# Patient Record
Sex: FEMALE | Race: WHITE | NOT HISPANIC OR LATINO | Employment: OTHER | URBAN - NONMETROPOLITAN AREA
[De-identification: names, ages, dates, MRNs, and addresses within clinical notes are randomized per-mention and may not be internally consistent; named-entity substitution may affect disease eponyms.]

---

## 2021-08-08 ENCOUNTER — HOSPITAL ENCOUNTER (EMERGENCY)
Facility: HOSPITAL | Age: 76
Discharge: HOME | End: 2021-08-08
Admitting: EMERGENCY MEDICINE

## 2021-08-08 ENCOUNTER — APPOINTMENT (OUTPATIENT)
Dept: GENERAL RADIOLOGY | Facility: HOSPITAL | Age: 76
End: 2021-08-08

## 2021-08-08 VITALS
HEART RATE: 73 BPM | TEMPERATURE: 98.5 F | WEIGHT: 130 LBS | DIASTOLIC BLOOD PRESSURE: 72 MMHG | HEIGHT: 63 IN | OXYGEN SATURATION: 97 % | SYSTOLIC BLOOD PRESSURE: 118 MMHG | RESPIRATION RATE: 19 BRPM | BODY MASS INDEX: 23.04 KG/M2

## 2021-08-08 DIAGNOSIS — R07.9 CHEST PAIN, UNSPECIFIED TYPE: Primary | ICD-10-CM

## 2021-08-08 LAB
ALBUMIN SERPL-MCNC: 5.2 G/DL (ref 3.5–5.2)
ALBUMIN/GLOB SERPL: 2.4 G/DL
ALP SERPL-CCNC: 87 U/L (ref 39–117)
ALT SERPL W P-5'-P-CCNC: 16 U/L (ref 1–33)
ANION GAP SERPL CALCULATED.3IONS-SCNC: 15 MMOL/L (ref 5–15)
AST SERPL-CCNC: 15 U/L (ref 1–32)
BASOPHILS # BLD AUTO: 0.03 10*3/MM3 (ref 0–0.2)
BASOPHILS NFR BLD AUTO: 0.6 % (ref 0–1.5)
BILIRUB SERPL-MCNC: 0.3 MG/DL (ref 0–1.2)
BUN SERPL-MCNC: 15 MG/DL (ref 8–23)
BUN/CREAT SERPL: 21.4 (ref 7–25)
CALCIUM SPEC-SCNC: 9.6 MG/DL (ref 8.6–10.5)
CHLORIDE SERPL-SCNC: 103 MMOL/L (ref 98–107)
CO2 SERPL-SCNC: 25 MMOL/L (ref 22–29)
CREAT SERPL-MCNC: 0.7 MG/DL (ref 0.57–1)
D DIMER PPP FEU-MCNC: 0.32 MG/L (FEU) (ref 0–0.5)
DEPRECATED RDW RBC AUTO: 40.2 FL (ref 37–54)
EOSINOPHIL # BLD AUTO: 0.11 10*3/MM3 (ref 0–0.4)
EOSINOPHIL NFR BLD AUTO: 2.1 % (ref 0.3–6.2)
ERYTHROCYTE [DISTWIDTH] IN BLOOD BY AUTOMATED COUNT: 12.8 % (ref 12.3–15.4)
GFR SERPL CREATININE-BSD FRML MDRD: 81 ML/MIN/1.73
GLOBULIN UR ELPH-MCNC: 2.2 GM/DL
GLUCOSE SERPL-MCNC: 92 MG/DL (ref 65–99)
HCT VFR BLD AUTO: 43.1 % (ref 34–46.6)
HGB BLD-MCNC: 15 G/DL (ref 12–15.9)
HOLD SPECIMEN: NORMAL
HOLD SPECIMEN: NORMAL
IMM GRANULOCYTES # BLD AUTO: 0 10*3/MM3 (ref 0–0.05)
IMM GRANULOCYTES NFR BLD AUTO: 0 % (ref 0–0.5)
INR PPP: 0.99 (ref 0.91–1.09)
LYMPHOCYTES # BLD AUTO: 2.07 10*3/MM3 (ref 0.7–3.1)
LYMPHOCYTES NFR BLD AUTO: 40.4 % (ref 19.6–45.3)
MCH RBC QN AUTO: 30.2 PG (ref 26.6–33)
MCHC RBC AUTO-ENTMCNC: 34.8 G/DL (ref 31.5–35.7)
MCV RBC AUTO: 86.9 FL (ref 79–97)
MONOCYTES # BLD AUTO: 0.36 10*3/MM3 (ref 0.1–0.9)
MONOCYTES NFR BLD AUTO: 7 % (ref 5–12)
NEUTROPHILS NFR BLD AUTO: 2.56 10*3/MM3 (ref 1.7–7)
NEUTROPHILS NFR BLD AUTO: 49.9 % (ref 42.7–76)
NRBC BLD AUTO-RTO: 0 /100 WBC (ref 0–0.2)
NT-PROBNP SERPL-MCNC: 37.9 PG/ML (ref 0–1800)
PLATELET # BLD AUTO: 322 10*3/MM3 (ref 140–450)
PMV BLD AUTO: 9.4 FL (ref 6–12)
POTASSIUM SERPL-SCNC: 3.8 MMOL/L (ref 3.5–5.2)
PROT SERPL-MCNC: 7.4 G/DL (ref 6–8.5)
PROTHROMBIN TIME: 12.3 SECONDS (ref 11.5–13.4)
RBC # BLD AUTO: 4.96 10*6/MM3 (ref 3.77–5.28)
SARS-COV-2 RNA PNL SPEC NAA+PROBE: NOT DETECTED
SODIUM SERPL-SCNC: 143 MMOL/L (ref 136–145)
TROPONIN T SERPL-MCNC: <0.01 NG/ML (ref 0–0.03)
TROPONIN T SERPL-MCNC: <0.01 NG/ML (ref 0–0.03)
WBC # BLD AUTO: 5.13 10*3/MM3 (ref 3.4–10.8)
WHOLE BLOOD HOLD SPECIMEN: NORMAL

## 2021-08-08 PROCEDURE — 93010 ELECTROCARDIOGRAM REPORT: CPT | Performed by: INTERNAL MEDICINE

## 2021-08-08 PROCEDURE — 93005 ELECTROCARDIOGRAM TRACING: CPT

## 2021-08-08 PROCEDURE — 87635 SARS-COV-2 COVID-19 AMP PRB: CPT | Performed by: NURSE PRACTITIONER

## 2021-08-08 PROCEDURE — 85610 PROTHROMBIN TIME: CPT | Performed by: NURSE PRACTITIONER

## 2021-08-08 PROCEDURE — 99284 EMERGENCY DEPT VISIT MOD MDM: CPT

## 2021-08-08 PROCEDURE — 83880 ASSAY OF NATRIURETIC PEPTIDE: CPT | Performed by: NURSE PRACTITIONER

## 2021-08-08 PROCEDURE — 84484 ASSAY OF TROPONIN QUANT: CPT

## 2021-08-08 PROCEDURE — 85379 FIBRIN DEGRADATION QUANT: CPT | Performed by: NURSE PRACTITIONER

## 2021-08-08 PROCEDURE — C9803 HOPD COVID-19 SPEC COLLECT: HCPCS | Performed by: NURSE PRACTITIONER

## 2021-08-08 PROCEDURE — 71045 X-RAY EXAM CHEST 1 VIEW: CPT

## 2021-08-08 PROCEDURE — 85025 COMPLETE CBC W/AUTO DIFF WBC: CPT

## 2021-08-08 PROCEDURE — 80053 COMPREHEN METABOLIC PANEL: CPT

## 2021-08-08 RX ORDER — LEVOTHYROXINE SODIUM 0.07 MG/1
75 TABLET ORAL DAILY
COMMUNITY

## 2021-08-08 RX ORDER — PHENOL 1.4 %
600 AEROSOL, SPRAY (ML) MUCOUS MEMBRANE DAILY
COMMUNITY

## 2021-08-08 RX ORDER — PANTOPRAZOLE SODIUM 40 MG/1
40 TABLET, DELAYED RELEASE ORAL DAILY
COMMUNITY

## 2021-08-08 RX ORDER — ASPIRIN 81 MG/1
81 TABLET ORAL NIGHTLY
COMMUNITY

## 2021-08-08 RX ORDER — VERAPAMIL HYDROCHLORIDE 120 MG/1
120 TABLET, FILM COATED ORAL NIGHTLY
COMMUNITY

## 2021-08-08 RX ORDER — PRAVASTATIN SODIUM 40 MG
40 TABLET ORAL NIGHTLY
COMMUNITY

## 2021-08-08 RX ORDER — METOPROLOL SUCCINATE 25 MG/1
12.5 TABLET, EXTENDED RELEASE ORAL DAILY
COMMUNITY

## 2021-08-08 RX ORDER — SODIUM CHLORIDE 0.9 % (FLUSH) 0.9 %
10 SYRINGE (ML) INJECTION AS NEEDED
Status: DISCONTINUED | OUTPATIENT
Start: 2021-08-08 | End: 2021-08-08 | Stop reason: HOSPADM

## 2021-08-08 RX ORDER — ASPIRIN 81 MG/1
324 TABLET, CHEWABLE ORAL ONCE
Status: COMPLETED | OUTPATIENT
Start: 2021-08-08 | End: 2021-08-08

## 2021-08-08 RX ADMIN — ASPIRIN 243 MG: 81 TABLET, CHEWABLE ORAL at 18:13

## 2021-08-08 NOTE — ED PROVIDER NOTES
Subjective   Patient is a 76-year-old white female presents the emergency department with intermittent left-sided chest pain and some shortness of breath for the past 3 days.  She states she was feeling very weak today.  She states she went for her walk yesterday and became a little more short of air.  She denies any cough or congestion.  No fever or chills.  She states she had a negative COVID-19 test last week.  She has had her Covid vaccine.  Patient does not have a history of CAD however she states both her mother and her father have had a history of heart disease.  She states she is not really having any pain at present just having some pressure in her chest.  She denies any radiation of the pain.  No nausea or vomiting.      History provided by:  Patient   used: No        Review of Systems   Constitutional: Negative.    HENT: Negative.    Eyes: Negative.    Respiratory: Negative.    Cardiovascular:        Patient is a 76-year-old white female presents the emergency department with intermittent left-sided chest pain and some shortness of breath for the past 3 days.  She states she was feeling very weak today.  She states she went for her walk yesterday and became a little more short of air.  She denies any cough or congestion.  No fever or chills.  She states she had a negative COVID-19 test last week.  She has had her Covid vaccine.  Patient does not have a history of CAD however she states both her mother and her father have had a history of heart disease.  She states she is not really having any pain at present just having some pressure in her chest.  She denies any radiation of the pain.  No nausea or vomiting.     Gastrointestinal: Negative.    Endocrine: Negative.    Genitourinary: Negative.    Musculoskeletal: Negative.    Skin: Negative.    Allergic/Immunologic: Negative.    Neurological: Negative.    Hematological: Negative.    Psychiatric/Behavioral: Negative.    All other systems  "reviewed and are negative.      Past Medical History:   Diagnosis Date   • Disease of thyroid gland    • GERD (gastroesophageal reflux disease)    • Hyperlipidemia    • Hypertension        No Known Allergies    Past Surgical History:   Procedure Laterality Date   • HYSTERECTOMY         History reviewed. No pertinent family history.    Social History     Socioeconomic History   • Marital status:      Spouse name: Not on file   • Number of children: Not on file   • Years of education: Not on file   • Highest education level: Not on file   Tobacco Use   • Smoking status: Never Smoker   Substance and Sexual Activity   • Alcohol use: Never   • Drug use: Never       Prior to Admission medications    Not on File       /72 (BP Location: Right arm, Patient Position: Sitting)   Pulse 73   Temp 98.5 °F (36.9 °C) (Oral)   Resp 19   Ht 160 cm (63\")   Wt 59 kg (130 lb)   SpO2 97%   BMI 23.03 kg/m²     Objective   Physical Exam  Vitals and nursing note reviewed.   Constitutional:       Appearance: She is well-developed.   HENT:      Head: Normocephalic and atraumatic.   Eyes:      Conjunctiva/sclera: Conjunctivae normal.      Pupils: Pupils are equal, round, and reactive to light.   Neck:      Thyroid: No thyromegaly.      Trachea: No tracheal deviation.   Cardiovascular:      Rate and Rhythm: Normal rate and regular rhythm.      Heart sounds: Normal heart sounds.   Pulmonary:      Effort: Pulmonary effort is normal. No respiratory distress.      Breath sounds: Normal breath sounds. No wheezing or rales.   Chest:      Chest wall: No tenderness.   Abdominal:      General: Bowel sounds are normal.      Palpations: Abdomen is soft.   Musculoskeletal:         General: Normal range of motion.      Cervical back: Normal range of motion and neck supple.   Skin:     General: Skin is warm and dry.   Neurological:      Mental Status: She is alert and oriented to person, place, and time.      Cranial Nerves: No cranial " nerve deficit.      Deep Tendon Reflexes: Reflexes are normal and symmetric.   Psychiatric:         Behavior: Behavior normal.         Thought Content: Thought content normal.         Judgment: Judgment normal.         Procedures         Lab Results (last 24 hours)     Procedure Component Value Units Date/Time    CBC & Differential [[id number]]  (Normal) Collected: 08/08/21 1759    Specimen: Blood Updated: 08/08/21 1830    Narrative:      The following orders were created for panel order CBC & Differential.  Procedure                               Abnormality         Status                     ---------                               -----------         ------                     CBC Auto Differential[[id number]]        Normal              Final result                 Please view results for these tests on the individual orders.    Comprehensive Metabolic Panel [[id number]] Collected: 08/08/21 1759    Specimen: Blood Updated: 08/08/21 1854     Glucose 92 mg/dL      BUN 15 mg/dL      Creatinine 0.70 mg/dL      Sodium 143 mmol/L      Potassium 3.8 mmol/L      Comment: Slight hemolysis detected by analyzer. Results may be affected.        Chloride 103 mmol/L      CO2 25.0 mmol/L      Calcium 9.6 mg/dL      Total Protein 7.4 g/dL      Albumin 5.20 g/dL      ALT (SGPT) 16 U/L      AST (SGOT) 15 U/L      Alkaline Phosphatase 87 U/L      Total Bilirubin 0.3 mg/dL      eGFR Non African Amer 81 mL/min/1.73      Globulin 2.2 gm/dL      A/G Ratio 2.4 g/dL      BUN/Creatinine Ratio 21.4     Anion Gap 15.0 mmol/L     Narrative:      GFR Normal >60  Chronic Kidney Disease <60  Kidney Failure <15      Troponin [[id number]]  (Normal) Collected: 08/08/21 1759    Specimen: Blood Updated: 08/08/21 1852     Troponin T <0.010 ng/mL     Narrative:      Troponin T Reference Range:  <= 0.03 ng/mL-   Negative for AMI  >0.03 ng/mL-     Abnormal for myocardial necrosis.  Clinicians would have to utilize clinical acumen, EKG, Troponin and serial  changes to determine if it is an Acute Myocardial Infarction or myocardial injury due to an underlying chronic condition.       Results may be falsely decreased if patient taking Biotin.      CBC Auto Differential [[id number]]  (Normal) Collected: 08/08/21 1759    Specimen: Blood Updated: 08/08/21 1830     WBC 5.13 10*3/mm3      RBC 4.96 10*6/mm3      Hemoglobin 15.0 g/dL      Hematocrit 43.1 %      MCV 86.9 fL      MCH 30.2 pg      MCHC 34.8 g/dL      RDW 12.8 %      RDW-SD 40.2 fl      MPV 9.4 fL      Platelets 322 10*3/mm3      Neutrophil % 49.9 %      Lymphocyte % 40.4 %      Monocyte % 7.0 %      Eosinophil % 2.1 %      Basophil % 0.6 %      Immature Grans % 0.0 %      Neutrophils, Absolute 2.56 10*3/mm3      Lymphocytes, Absolute 2.07 10*3/mm3      Monocytes, Absolute 0.36 10*3/mm3      Eosinophils, Absolute 0.11 10*3/mm3      Basophils, Absolute 0.03 10*3/mm3      Immature Grans, Absolute 0.00 10*3/mm3      nRBC 0.0 /100 WBC     BNP [[id number]]  (Normal) Collected: 08/08/21 1759    Specimen: Blood Updated: 08/08/21 1851     proBNP 37.9 pg/mL     Narrative:      Among patients with dyspnea, NT-proBNP is highly sensitive for the detection of acute congestive heart failure. In addition NT-proBNP of <300 pg/ml effectively rules out acute congestive heart failure with 99% negative predictive value.    Results may be falsely decreased if patient taking Biotin.      Protime-INR [[id number]]  (Normal) Collected: 08/08/21 1817    Specimen: Blood Updated: 08/08/21 1840     Protime 12.3 Seconds      INR 0.99    D-dimer, Quantitative [[id number]]  (Normal) Collected: 08/08/21 1817    Specimen: Blood Updated: 08/08/21 1840     D-Dimer, Quantitative 0.32 mg/L (FEU)     Narrative:      Reference Range is 0-0.50 mg/L FEU. However, results <0.50 mg/L FEU tends to rule out DVT or PE. Results >0.50 mg/L FEU are not useful in predicting absence or presence of DVT or PE.      COVID PRE-OP / PRE-PROCEDURE SCREENING ORDER (NO  ISOLATION) - Swab, Nasal Cavity [[id number]]  (Normal) Collected: 08/08/21 1818    Specimen: Swab from Nasal Cavity Updated: 08/08/21 1902    Narrative:      The following orders were created for panel order COVID PRE-OP / PRE-PROCEDURE SCREENING ORDER (NO ISOLATION) - Swab, Nasal Cavity.  Procedure                               Abnormality         Status                     ---------                               -----------         ------                     COVID-19,[facility]...[[id number]]  Normal              Final result                 Please view results for these tests on the individual orders.    COVID-19,[facility]-HOU[facility],Nasal Swab No Transport Media 3-4 HR TAT - Swab, Nasal Cavity [[id number]]  (Normal) Collected: 08/08/21 1818    Specimen: Swab from Nasal Cavity Updated: 08/08/21 1902     COVID19 Not Detected    Narrative:      Fact sheet for providers: https://www.fda.gov/media/323590/download     Fact sheet for patients: https://www.fda.gov/media/164990/download    Test performed by PCR.    Consider negative results in combination with clinical observations, patient history, and epidemiological information.    Troponin [[id number]]  (Normal) Collected: 08/08/21 2016    Specimen: Blood Updated: 08/08/21 2046     Troponin T <0.010 ng/mL     Narrative:      Troponin T Reference Range:  <= 0.03 ng/mL-   Negative for AMI  >0.03 ng/mL-     Abnormal for myocardial necrosis.  Clinicians would have to utilize clinical acumen, EKG, Troponin and serial changes to determine if it is an Acute Myocardial Infarction or myocardial injury due to an underlying chronic condition.       Results may be falsely decreased if patient taking Biotin.            XR Chest 1 View   Final Result   1.. Bibasilar atelectasis. Lungs are otherwise clear. No evidence of   lobar pneumonia.   This report was finalized on 08/08/2021 19:45 by Dr. Miguel Hilario MD.          ED Course  ED Course as of Aug 09 1353   Sun Aug 08, 2021    1900 Reviewed results with pt thus far. Her ekg and troponin are negative and pending the 2nd troponin and ekg at this time. Pt denies chest pain at present. Due to pt's hx of htn and hyperlipidemia, offered admission for stress test tomorrow. Pt states that she wants to do 2nd set of cardiac enzymes and ekg and go home and have outpt stress test. Pt states that she will need to do dobutamine test. She denies chest pain at present. Reviewed pt and pt care plan with ABDIAZIZ MUNROE. Care of pt transferred at this time     [CW]   2109 Second troponin is negative.  His repeat EKG is abnormal per Dr Kramer.  I discussed admission with her.  She continues to decline admission.  I then discussed the cardiac stress test with her.  She states she has decided she does not want dobutamine.  She would like a treadmill stress test which I will order.  She voiced understanding of all results and instructions.     [KS]      ED Course User Index  [CW] Francesca Ronquillo APRN  [KS] Ed Connelly APRN         HEART Score (for prediction of 6-week risk of major adverse cardiac event) reviewed and/or performed as part of the patient evaluation and treatment planning process.  The result associated with this review/performance is: 3      MDM  Number of Diagnoses or Management Options  Chest pain, unspecified type: new and requires workup     Amount and/or Complexity of Data Reviewed  Clinical lab tests: ordered and reviewed  Tests in the radiology section of CPT®: ordered and reviewed    Patient Progress  Patient progress: stable      Final diagnoses:   Chest pain, unspecified type          Francesca Ronquillo APRN  08/09/21 8024

## 2021-08-08 NOTE — ED TRIAGE NOTES
Pt reports chest heaviness for the last 3 days. Patient denies heart hx other than htn.Patient took an 81mg asa, denies nitro.

## 2021-08-09 LAB
QT INTERVAL: 414 MS
QT INTERVAL: 440 MS
QTC INTERVAL: 430 MS
QTC INTERVAL: 450 MS

## 2021-08-09 NOTE — DISCHARGE INSTRUCTIONS
Drink plenty of fluid. Continue home medication.  Cardiac stress test to be completed as an outpatient.  Scheduling should call you.  Their number is [phone].  Follow up with PCP - call tomorrow for appointment. Return to ED if condition does not improve or worsens

## 2021-08-19 ENCOUNTER — HOSPITAL ENCOUNTER (OUTPATIENT)
Dept: CARDIOLOGY | Facility: HOSPITAL | Age: 76
Discharge: HOME | End: 2021-08-19
Admitting: NURSE PRACTITIONER

## 2021-08-19 VITALS — BODY MASS INDEX: 22.5 KG/M2 | WEIGHT: 127 LBS | HEIGHT: 63 IN

## 2021-08-19 DIAGNOSIS — R07.9 CHEST PAIN, UNSPECIFIED TYPE: ICD-10-CM

## 2021-08-19 LAB
BH CV STRESS BP STAGE 1: NORMAL
BH CV STRESS BP STAGE 2: NORMAL
BH CV STRESS DURATION MIN STAGE 1: 3
BH CV STRESS DURATION MIN STAGE 2: 2
BH CV STRESS DURATION SEC STAGE 1: 0
BH CV STRESS DURATION SEC STAGE 2: 19
BH CV STRESS GRADE STAGE 1: 10
BH CV STRESS GRADE STAGE 2: 12
BH CV STRESS HR STAGE 1: 117
BH CV STRESS HR STAGE 2: 129
BH CV STRESS METS STAGE 1: 5
BH CV STRESS METS STAGE 2: 7.5
BH CV STRESS PROTOCOL 1: NORMAL
BH CV STRESS RECOVERY BP: NORMAL MMHG
BH CV STRESS RECOVERY HR: 80 BPM
BH CV STRESS SPEED STAGE 1: 1.7
BH CV STRESS SPEED STAGE 2: 2.5
BH CV STRESS STAGE 1: 1
BH CV STRESS STAGE 2: 2
MAXIMAL PREDICTED HEART RATE: 144 BPM
PERCENT MAX PREDICTED HR: 89.58 %
STRESS BASELINE BP: NORMAL MMHG
STRESS BASELINE HR: 67 BPM
STRESS PERCENT HR: 105 %
STRESS POST ESTIMATED WORKLOAD: 7.5 METS
STRESS POST EXERCISE DUR MIN: 5 MIN
STRESS POST EXERCISE DUR SEC: 19 SEC
STRESS POST PEAK BP: NORMAL MMHG
STRESS POST PEAK HR: 129 BPM
STRESS TARGET HR: 122 BPM

## 2021-08-19 PROCEDURE — 93018 CV STRESS TEST I&R ONLY: CPT | Performed by: INTERNAL MEDICINE

## 2021-08-19 PROCEDURE — 93017 CV STRESS TEST TRACING ONLY: CPT

## 2021-08-19 PROCEDURE — 93350 STRESS TTE ONLY: CPT | Performed by: INTERNAL MEDICINE

## 2021-08-19 PROCEDURE — 93352 ADMIN ECG CONTRAST AGENT: CPT | Performed by: INTERNAL MEDICINE

## 2021-08-19 PROCEDURE — 93350 STRESS TTE ONLY: CPT

## 2021-08-19 PROCEDURE — 25010000002 PERFLUTREN 6.52 MG/ML SUSPENSION: Performed by: INTERNAL MEDICINE

## 2021-08-19 RX ADMIN — PERFLUTREN 8.48 MG: 6.52 INJECTION, SUSPENSION INTRAVENOUS at 13:42

## 2021-08-23 ENCOUNTER — TELEPHONE (OUTPATIENT)
Dept: EMERGENCY DEPT | Facility: HOSPITAL | Age: 76
End: 2021-08-23

## 2021-08-23 NOTE — TELEPHONE ENCOUNTER
----- Message from Eliu Souza PA-C sent at 8/19/2021  7:19 PM CDT -----  Please call patient to advise of low risk stress test and need for continued PCP follow-up.

## 2021-08-24 ENCOUNTER — TELEPHONE (OUTPATIENT)
Dept: EMERGENCY DEPT | Facility: HOSPITAL | Age: 76
End: 2021-08-24

## 2022-07-12 RX ORDER — FAMOTIDINE 40 MG/1
40 TABLET, FILM COATED ORAL DAILY
COMMUNITY

## 2022-07-18 ENCOUNTER — OFFICE VISIT (OUTPATIENT)
Dept: CARDIOLOGY | Facility: CLINIC | Age: 77
End: 2022-07-18

## 2022-07-18 VITALS
HEART RATE: 63 BPM | DIASTOLIC BLOOD PRESSURE: 80 MMHG | OXYGEN SATURATION: 98 % | BODY MASS INDEX: 23.21 KG/M2 | WEIGHT: 131 LBS | SYSTOLIC BLOOD PRESSURE: 124 MMHG | HEIGHT: 63 IN

## 2022-07-18 DIAGNOSIS — E78.2 MIXED HYPERLIPIDEMIA: ICD-10-CM

## 2022-07-18 DIAGNOSIS — R93.1 ABNORMAL ECHOCARDIOGRAM: Primary | ICD-10-CM

## 2022-07-18 DIAGNOSIS — I10 PRIMARY HYPERTENSION: ICD-10-CM

## 2022-07-18 PROCEDURE — 93000 ELECTROCARDIOGRAM COMPLETE: CPT | Performed by: INTERNAL MEDICINE

## 2022-07-18 PROCEDURE — 99204 OFFICE O/P NEW MOD 45 MIN: CPT | Performed by: INTERNAL MEDICINE

## 2022-07-18 NOTE — PROGRESS NOTES
Subjective:     Encounter Date:07/18/2022      Patient ID: Twila Tanner is a 77 y.o. female with hypertension, hyperlipidemia, hypothyroidism, referred here for further evaluation of an abnormal echocardiogram.    Referring Provider: LUDWIG Norman  Reason for referral: Abnormal echocardiogram    Chief Complaint: Here today for further evaluation after an abnormal echocardiogram    HPI: This is a 77-year-old female with no prior cardiac history but with a history of hypertension, hyperlipidemia who presents today for further evaluation of an abnormal echocardiogram.  The patient had a col or upper respiratory type illness earlier this year.  She developed prolonged coughing and some shortness of breath related to this.  It was during this time that the patient had a chest x-ray which was interpreted as having cardiomegaly.  This was then followed with an echocardiogram which is referenced below.  The patient now presents today to discuss the results of this study.  The patient notes that her breathing has since stabilized.  She is no longer coughing.  No significant shortness of breath, dyspnea on exertion.  The patient denies ever having any chest discomfort of any significance.  She denies having palpitations, lightheadedness, dizziness, syncope.  No orthopnea, PND, edema.  The patient does have hypertension and notes that her blood pressure is generally well controlled.  She also is on statin therapy due to history of hyperlipidemia.  Her lipids have been reportedly well controlled.  The patient denies having any other cardiac issues including coronary artery disease, heart failure, arrhythmias.  She says that she seems to be doing well and feeling well at this time.    The following portions of the patient's history were reviewed and updated as appropriate: allergies, current medications, past family history, past medical history, past social history, past surgical history and problem list.      Past Medical History:   Diagnosis Date   • Disease of thyroid gland    • GERD (gastroesophageal reflux disease)    • Hyperlipidemia    • Hypertension      Past Surgical History:   Procedure Laterality Date   • HYSTERECTOMY         Current Outpatient Medications:   •  aspirin 81 MG EC tablet, Take 81 mg by mouth Every Night., Disp: , Rfl:   •  Cyanocobalamin (VITAMIN B-12 PO), Take 2,500 mcg by mouth Daily., Disp: , Rfl:   •  famotidine (PEPCID) 40 MG tablet, Take 40 mg by mouth Daily., Disp: , Rfl:   •  levothyroxine (SYNTHROID, LEVOTHROID) 75 MCG tablet, Take 75 mcg by mouth Daily., Disp: , Rfl:   •  metoprolol succinate XL (TOPROL-XL) 25 MG 24 hr tablet, Take 12.5 mg by mouth Daily., Disp: , Rfl:   •  Multiple Vitamins-Minerals (OCUVITE ADULT 50+ PO), Take 1 tablet by mouth Daily., Disp: , Rfl:   •  pantoprazole (PROTONIX) 40 MG EC tablet, Take 40 mg by mouth Daily., Disp: , Rfl:   •  pravastatin (PRAVACHOL) 40 MG tablet, Take 40 mg by mouth Every Night., Disp: , Rfl:   •  verapamil (CALAN) 120 MG tablet, Take 120 mg by mouth Every Night., Disp: , Rfl:   •  calcium carbonate (OS-VARGAS) 600 MG tablet, Take 600 mg by mouth Daily., Disp: , Rfl:     No Known Allergies    Social History     Tobacco Use   • Smoking status: Former Smoker     Years: 50.00     Types: Cigarettes     Quit date:      Years since quittin.5   • Smokeless tobacco: Never Used   Substance Use Topics   • Alcohol use: Never     Family History   Problem Relation Age of Onset   • Heart disease Mother    • Cancer Father      Review of Systems   Constitutional: Negative for chills, fever and weight loss.   HENT: Negative for congestion and hearing loss.    Eyes: Negative for blurred vision and pain.   Cardiovascular: Negative for chest pain, dyspnea on exertion, leg swelling, orthopnea, palpitations, paroxysmal nocturnal dyspnea and syncope.   Respiratory: Negative for cough, shortness of breath and wheezing.    Endocrine: Negative for cold  intolerance and heat intolerance.   Hematologic/Lymphatic: Negative for adenopathy and bleeding problem.   Skin: Negative for color change, poor wound healing and rash.   Musculoskeletal: Negative for myalgias and neck pain.   Gastrointestinal: Negative for abdominal pain, nausea and vomiting.   Genitourinary: Negative for dysuria and frequency.   Neurological: Negative for dizziness, focal weakness, headaches, light-headedness, loss of balance and numbness.   Psychiatric/Behavioral: Negative for altered mental status and memory loss.   Allergic/Immunologic: Negative for hives and persistent infections.       ECG 12 Lead    Date/Time: 7/18/2022 12:42 PM  Performed by: Saul Meier MD  Authorized by: aSul Meier MD   Comparison: compared with previous ECG from 8/8/2021  Similar to previous ECG  Rhythm: sinus rhythm  Rate: normal  BPM: 67  Conduction: conduction normal  QRS axis: normal  Other findings: non-specific ST-T wave changes    Clinical impression: non-specific ECG             Objective:     Vitals reviewed.   Constitutional:       General: Not in acute distress.     Appearance: Normal and healthy appearance. Well-developed. Not toxic-appearing or diaphoretic.   Eyes:      General: Lids are normal.      Extraocular Movements: Extraocular movements intact.      Pupils: Pupils are equal, round, and reactive to light.   HENT:      Head: Normocephalic and atraumatic.      Right Ear: External ear normal.      Left Ear: External ear normal.      Nose: Nose normal.    Mouth/Throat:      Mouth: Mucous membranes are not pale, not dry and not cyanotic.   Neck:      Thyroid: No thyroid mass or thyromegaly.      Vascular: No carotid bruit, hepatojugular reflux or JVD.      Trachea: No tracheal deviation.      Lymphadenopathy: No cervical adenopathy.   Pulmonary:      Effort: Pulmonary effort is normal. No accessory muscle usage or respiratory distress.      Breath sounds: Normal breath sounds. No  "wheezing. No rhonchi. No rales.   Chest:      Chest wall: Not tender to palpatation.   Cardiovascular:      Normal rate. Regular rhythm.      Murmurs: There is no murmur.      No gallop.   Pulses:     Intact distal pulses.   Edema:     Peripheral edema absent.   Abdominal:      General: Bowel sounds are normal. There is no distension or abdominal bruit.      Palpations: Abdomen is soft.      Tenderness: There is no abdominal tenderness.   Musculoskeletal: Normal range of motion.         General: No tenderness or deformity.      Extremities: No clubbing present.     Cervical back: Normal range of motion and neck supple. No edema. Skin:     General: Skin is warm and dry. There is no cyanosis.      Findings: No erythema or rash.   Neurological:      General: No focal deficit present.      Mental Status: Oriented to person, place, and time and oriented to person, place and time.      Cranial Nerves: No cranial nerve deficit.   Psychiatric:         Attention and Perception: Attention normal.         Mood and Affect: Mood normal.         Speech: Speech normal.         Behavior: Behavior normal. Behavior is cooperative.         Thought Content: Thought content normal.       /80   Pulse 63   Ht 160 cm (63\")   Wt 59.4 kg (131 lb)   SpO2 98%   BMI 23.21 kg/m²     Data/Lab Review:     Labs from 6/10/2022: Hemoglobin 14, hematocrit 43, platelets 363,000, potassium 4.1, creatinine 0.7, TSH of 0.368 with free T4 1.78.  Also lipid panel shows LDL 75, HDL 52, triglycerides 443    Chest x-ray from 6/13/2022 shows no previous chest x-ray for comparison.  The report indicates borderline enlarged heart.  There is conflicting information as the cardiothoracic ratio was reported as being enlarged however the following statement says the heart appears to be within normal size limits on the lateral view.    Echocardiogram performed on 6/23/2022: This study was ordered with an indication of \"cardiomegaly\".  I was able to review " the images from the echocardiogram performed on 6/23/2022.  Left ventricular ejection fraction appears to be normal or low normal.  Diastolic dysfunction is noted.  Normal right ventricular size and systolic function.  No hemodynamically significant valvular abnormalities.    Results for orders placed during the hospital encounter of 08/19/21    Adult Stress Echo W/ Cont or Stress Agent if Necessary Per Protocol    Interpretation Summary  · Low risk stress test.  · No clinical, ECG, or echocardiographic evidence of ischemia.  · Florentino treadmill score +5.3 (low risk).        Assessment:          Diagnosis Plan   1. Abnormal echocardiogram  ECG 12 Lead   2. Primary hypertension     3. Mixed hyperlipidemia            Plan:       1.  Abnormal echocardiogram: We did review the patient's echocardiogram today.  I viewed all of the images.  The patient has low normal systolic function.  As far as the indication for the study being cardiomegaly, the patient does have normal cardiac size.  Normal wall thickness and normal ventricular sizes noted.  Therefore, the patient does not have an enlarged heart.  She does have borderline left atrial enlargement, however this is not clinically significant, especially given her age and history of hypertension, diastolic dysfunction.  No further cardiac work-up would be indicated at this time given the findings of the echocardiogram.  While the echocardiogram is not completely normal due to the presence of diastolic dysfunction and slight left atrial enlargement, there are no pathologic findings that would require any further evaluation at this time.  I did discuss the findings of the echocardiogram in detail with the patient today at length.  All questions were answered and she seemed relieved with the recommendation that no further work-up would be indicated.    2.  Essential hypertension: Blood pressure is currently well controlled.  Continue to monitor.  Continue current  medications.    3.  Mixed hyperlipidemia: The patient is on statin therapy and tolerating this well.  Her most recent lipid panel is referenced above, showing LDL cholesterol to be reasonable given the patient's current risk profile.    We will plan to see the patient back as needed at this time.

## 2022-07-21 ENCOUNTER — PATIENT ROUNDING (BHMG ONLY) (OUTPATIENT)
Dept: CARDIOLOGY | Facility: CLINIC | Age: 77
End: 2022-07-21

## 2022-07-21 NOTE — PROGRESS NOTES
A [employer] message has been sent to the patient for PATIENT ROUNDING with [facility].

## 2023-08-04 ENCOUNTER — TELEPHONE (OUTPATIENT)
Dept: CARDIOLOGY | Facility: CLINIC | Age: 78
End: 2023-08-04

## 2023-08-04 NOTE — TELEPHONE ENCOUNTER
[facility] received a fax that requires your attention. The document has been indexed to the patient’s chart for your review.      Reason for sending: EXTERNAL MEDICAL RECORD NOTIFICATION     Documents Description: PROGRESS NOTE & EKG    Name of Sender: [facility]     Date Indexed: 8.4.23

## 2023-08-07 ENCOUNTER — TELEPHONE (OUTPATIENT)
Dept: CARDIOLOGY | Facility: CLINIC | Age: 78
End: 2023-08-07

## 2023-08-07 NOTE — TELEPHONE ENCOUNTER
[facility] received a fax that requires your attention. The document has been indexed to the patient’s chart for your review.      Reason for sending: EXTERNAL MEDICAL RECORD NOTIFICATION     Documents Description: PROGRESS NOTES     Name of Sender: [facility]     Date Indexed: 8.4.23

## 2023-08-08 ENCOUNTER — OFFICE VISIT (OUTPATIENT)
Dept: CARDIOLOGY | Facility: CLINIC | Age: 78
End: 2023-08-08
Payer: MEDICARE

## 2023-08-08 VITALS
BODY MASS INDEX: 23.39 KG/M2 | HEIGHT: 63 IN | WEIGHT: 132 LBS | DIASTOLIC BLOOD PRESSURE: 70 MMHG | SYSTOLIC BLOOD PRESSURE: 120 MMHG | HEART RATE: 86 BPM | OXYGEN SATURATION: 97 %

## 2023-08-08 DIAGNOSIS — E78.5 HYPERLIPIDEMIA LDL GOAL <100: ICD-10-CM

## 2023-08-08 DIAGNOSIS — E03.9 HYPOTHYROIDISM (ACQUIRED): ICD-10-CM

## 2023-08-08 DIAGNOSIS — I10 PRIMARY HYPERTENSION: Primary | ICD-10-CM

## 2023-08-08 PROCEDURE — 1160F RVW MEDS BY RX/DR IN RCRD: CPT | Performed by: NURSE PRACTITIONER

## 2023-08-08 PROCEDURE — 93000 ELECTROCARDIOGRAM COMPLETE: CPT | Performed by: NURSE PRACTITIONER

## 2023-08-08 PROCEDURE — 1159F MED LIST DOCD IN RCRD: CPT | Performed by: NURSE PRACTITIONER

## 2023-08-08 PROCEDURE — 99214 OFFICE O/P EST MOD 30 MIN: CPT | Performed by: NURSE PRACTITIONER

## 2023-08-08 RX ORDER — EZETIMIBE 10 MG/1
10 TABLET ORAL DAILY
Qty: 30 TABLET | Refills: 1 | Status: SHIPPED | OUTPATIENT
Start: 2023-08-08

## 2023-08-08 NOTE — PROGRESS NOTES
Subjective:     Encounter Date:08/08/2023      Patient ID: Twila Tanner is a 78 y.o. female with hypertension, hypothyroidism, hyperlipidemia, and family history of CAD, and recent diagnosis of arthritis.  She presents today at that recommendation of her primary provider due to recent elevated BP.      Chief Complaint: Hypertension  History of Present Illness    Ms. Tanner presents to the office today for follow-up due to recent elevations in her blood pressure at the recommendations of her primary care provider.  She actually been evaluated by Dr. Meier in July 2022 when she was referred to our office due to reported abnormal echocardiogram.  At that time, imaging was reviewed and discussed with the patient at her office visit.  And ultimately no further workup was indicated at that time and the patient was recommended to follow-up in our office as needed.    She has known hypertension and hyperlipidemia which is managed through her primary care office.    She tells me that she was diagnosed with arthritis in February of this year.  Since February she has had difficulty with tolerating certain activities and become less active given arthritis.  She tells me she has had increased fatigue since February but blames this on a decrease in activity levels including routine exercise.  In addition to that she has also been started on replacement therapy for hypothyroidism.  She tells me her blood work was recently abnormal once again.  She has required multiple doses and medications.     Last Thursday she had an elevated blood pressure reading at home, 166/89.  She went to her primary care office or blood pressure was rechecked noted to be 172/100.  She tells me that they performed blood work and an ECG.  She was told that her ECG was okay but that her labs indicated abnormalities with her thyroid once again.  It was at that time she was recommended to be followed up with cardiology given her blood  pressure.    She reports generally that her blood pressures are well-controlled.  She checked her blood pressure this morning and it was 144/77.  This was just after taking her morning medications.  She tells me that she has been managed on verapamil and metoprolol for some time.    She denies any chest pain, chest tightness.  She reports some mild chest pressure from time to time lasting only a few seconds not felt to be worsening occurring at random.  Denies any profound episodes of chest discomfort or shortness of breath.  She denies any significant dyspnea on exertion.  She denies orthopnea or PND.  Her main complaint regarding shortness of breath is when she bends over.  She denies any lightheadedness, dizziness, near-syncope, syncope.  She has had no palpitations.  She denies any significant leg swelling, abdominal bloating.      The following portions of the patient's history were reviewed and updated as appropriate: allergies, current medications, past family history, past medical history, past social history, and past surgical history.    No Known Allergies      Current Outpatient Medications:     aspirin 81 MG EC tablet, Take 1 tablet by mouth Every Night., Disp: , Rfl:     Cyanocobalamin (VITAMIN B-12 PO), Take 2,500 mcg by mouth Daily., Disp: , Rfl:     levothyroxine (SYNTHROID, LEVOTHROID) 50 MCG tablet, Take 1 tablet by mouth Daily., Disp: , Rfl:     metoprolol succinate XL (TOPROL-XL) 25 MG 24 hr tablet, Take 0.5 tablets by mouth Daily., Disp: , Rfl:     pantoprazole (PROTONIX) 40 MG EC tablet, Take 1 tablet by mouth Daily., Disp: , Rfl:     verapamil (CALAN) 120 MG tablet, Take 1 tablet by mouth Every Night., Disp: , Rfl:     ezetimibe (ZETIA) 10 MG tablet, Take 1 tablet by mouth Daily., Disp: 30 tablet, Rfl: 1      Review of Systems   Constitutional: Positive for malaise/fatigue.   Cardiovascular:  Positive for chest pain (chest presure on rare occassion - mild). Negative for dyspnea on exertion,  "irregular heartbeat, leg swelling, near-syncope, orthopnea, palpitations, paroxysmal nocturnal dyspnea and syncope.   Respiratory:  Negative for cough, shortness of breath and wheezing.    Hematologic/Lymphatic: Negative for bleeding problem. Bruises/bleeds easily.   Musculoskeletal:  Positive for arthritis.   Gastrointestinal:  Negative for nausea and vomiting.   Neurological:  Negative for dizziness, focal weakness, headaches, light-headedness and weakness.   Psychiatric/Behavioral:  Negative for altered mental status.        ECG 12 Lead    Date/Time: 8/8/2023 12:46 PM  Performed by: Evangelina Goldman APRN  Authorized by: Evangelina Goldman APRN   Comparison: compared with previous ECG from 7/18/2022  Similar to previous ECG  Rhythm: sinus rhythm  Rate: normal  BPM: 86  Conduction: conduction normal  T inversion: III, aVF, V3, V4 and V5  QRS axis: normal  Other findings: T wave abnormality    Clinical impression: abnormal EKG        /70   Pulse 86   Ht 160 cm (63\")   Wt 59.9 kg (132 lb)   SpO2 97%   BMI 23.38 kg/mý        Objective:     Vitals reviewed.   Constitutional:       General: Awake. Not in acute distress.     Appearance: Normal and healthy appearance. Well-developed, well-groomed and not in distress.   HENT:      Head: Normocephalic and atraumatic.   Pulmonary:      Effort: Pulmonary effort is normal.      Breath sounds: Normal breath sounds. No wheezing. No rhonchi. No rales.   Cardiovascular:      Normal rate. Regular rhythm.      Murmurs: There is no murmur.      No gallop.  No rub.   Edema:     Peripheral edema absent.   Musculoskeletal:      Cervical back: Normal range of motion and neck supple. Skin:     General: Skin is warm and dry.   Neurological:      Mental Status: Alert, oriented to person, place, and time and oriented to person, place and time.   Psychiatric:         Attention and Perception: Attention normal.         Mood and Affect: Mood normal.         Speech: Speech normal.       "   Behavior: Behavior normal. Behavior is cooperative.       Lab Review:       Assessment:          Diagnosis Plan   1. Primary hypertension        2. Hyperlipidemia LDL goal <100        3. Hypothyroidism (acquired)               Plan:       -Hypertension: Recent elevation in blood pressure however was an isolated event.  Generally blood pressure is well-controlled.  She is monitor this at home.  I encouraged her to check the accuracy of her monitor with her primary care office however she has had no significant elevation in her readings at home except for her most recent event last Thursday.  Overall would not recommend adjustments to her blood pressure medication as she generally has stable readings without symptoms.    -Hyperlipidemia: Continue with management through primary care.    -Hypothyroidism: The patient reports difficulty in management of her thyroid recently.  She tells me recent blood work drawn by her primary care office noted abnormalities once again.  She is also being considered for referral to endocrinology.      No further adjustments to medications for blood pressure control at this time.  We discussed risk factor management for coronary artery disease.  She does have a family history of CAD.  She has an abnormal EKG that is consistent with her previous EKG and no significant changes in symptoms to suggest need for further cardiac workup at this time.  We did discuss the possibility of CT coronary angiogram in the future.  She thinks her most recent stamina changes due to her diagnosis of arthritis and that some of her symptoms are also related to her recent abnormalities in her thyroid labs.  She favors conservative management at this time which is reasonable.  We discussed 6-month follow-up to reassess any symptoms and discuss further workup at that time if indicated.

## 2023-08-23 RX ORDER — EZETIMIBE 10 MG/1
10 TABLET ORAL DAILY
Qty: 90 TABLET | Refills: 3 | Status: SHIPPED | OUTPATIENT
Start: 2023-08-23

## 2023-08-31 RX ORDER — EZETIMIBE 10 MG/1
TABLET ORAL
Qty: 30 TABLET | Refills: 1 | OUTPATIENT
Start: 2023-08-31

## 2024-02-23 ENCOUNTER — OFFICE VISIT (OUTPATIENT)
Dept: CARDIOLOGY | Facility: CLINIC | Age: 79
End: 2024-02-23
Payer: MEDICARE

## 2024-02-23 VITALS
OXYGEN SATURATION: 96 % | SYSTOLIC BLOOD PRESSURE: 148 MMHG | DIASTOLIC BLOOD PRESSURE: 72 MMHG | RESPIRATION RATE: 18 BRPM | WEIGHT: 137.8 LBS | BODY MASS INDEX: 24.41 KG/M2 | HEART RATE: 63 BPM | HEIGHT: 63 IN

## 2024-02-23 DIAGNOSIS — E78.5 HYPERLIPIDEMIA LDL GOAL <100: ICD-10-CM

## 2024-02-23 DIAGNOSIS — I10 PRIMARY HYPERTENSION: Primary | ICD-10-CM

## 2024-02-23 DIAGNOSIS — E03.9 HYPOTHYROIDISM (ACQUIRED): ICD-10-CM

## 2024-02-23 PROCEDURE — 93000 ELECTROCARDIOGRAM COMPLETE: CPT | Performed by: NURSE PRACTITIONER

## 2024-02-23 PROCEDURE — 1160F RVW MEDS BY RX/DR IN RCRD: CPT | Performed by: NURSE PRACTITIONER

## 2024-02-23 PROCEDURE — 99214 OFFICE O/P EST MOD 30 MIN: CPT | Performed by: NURSE PRACTITIONER

## 2024-02-23 PROCEDURE — 1159F MED LIST DOCD IN RCRD: CPT | Performed by: NURSE PRACTITIONER

## 2024-02-23 NOTE — PROGRESS NOTES
Subjective:     Encounter Date: 02/23/2024      Patient ID: Twila Tanner is a 78 y.o. female with hypertension, hypothyroidism, hyperlipidemia, and family history of CAD, and arthritis.  She presents today for follow-up.    Chief Complaint: Routine Follow up   History of Present Illness    Ms. Tanner presents to the office today for follow-up. She was seen in our office last for evaluation of hypertension at the request of her primary care office.     She started feeling worse, in general, about a year ago.  She felt worsening complaints that were related to arthritis.  She continued with progressive pain and fatigue. She eventually was referred to rheumatology.  She was seen by Dr. Hall and diagnosed with polymyalgia rheumatica.  She was treated with steroids.  She was weaned and recently steroid use was discontinued.  She feels improved from an arthritis standpoint however continues to report fatigue.    She reports fatigue and reduced stamina.  She feels that she tolerates less activity in a day than she did 1 year ago.  She has no significant complaints of shortness of breath.  She denies orthopnea, PND.  She denies any rapid weight gain but has gained 5 pounds since her last visit approximately 6 months ago.  She is compliant with her home medications.  She does not monitor her blood pressure routinely but when she does check it at home and when she is seen in doctors offices she reports systolic readings generally in the 140s.    She reports random episodes of chest pains.  Some she describes as chest pressure others feel more like a sharp pain.  She reports that these episodes last approximately 1 minute or less.  She denies any associated symptoms with the chest pain.  She reports that these occur at random.      She was cleaning her house and putting away household items in the attic the other day.  She was able to complete all her activities but she felt fatigued and tired after doing so and  felt that this tired her more quickly than it would have last winter.      The following portions of the patient's history were reviewed and updated as appropriate: allergies, current medications, past family history, past medical history, past social history, and past surgical history.    No Known Allergies      Current Outpatient Medications:     aspirin 81 MG EC tablet, Take 1 tablet by mouth Every Night., Disp: , Rfl:     ezetimibe (ZETIA) 10 MG tablet, Take 1 tablet by mouth Daily., Disp: 90 tablet, Rfl: 3    levothyroxine (SYNTHROID, LEVOTHROID) 75 MCG tablet, Take 1 tablet by mouth Daily., Disp: , Rfl:     metoprolol succinate XL (TOPROL-XL) 25 MG 24 hr tablet, Take 0.5 tablets by mouth Daily., Disp: , Rfl:     pantoprazole (PROTONIX) 40 MG EC tablet, Take 1 tablet by mouth Daily., Disp: , Rfl:     verapamil (CALAN) 120 MG tablet, Take 1 tablet by mouth Every Night., Disp: , Rfl:       Review of Systems   Constitutional: Positive for malaise/fatigue and weight gain.   Cardiovascular:  Positive for chest pain (pressure/sharp). Negative for dyspnea on exertion, irregular heartbeat, leg swelling, near-syncope, orthopnea, palpitations, paroxysmal nocturnal dyspnea and syncope.   Respiratory:  Negative for cough, shortness of breath and wheezing.    Hematologic/Lymphatic: Negative for bleeding problem. Bruises/bleeds easily.   Musculoskeletal:  Positive for arthritis.   Gastrointestinal:  Negative for nausea and vomiting.   Neurological:  Negative for dizziness, focal weakness, headaches, light-headedness and weakness.   Psychiatric/Behavioral:  Negative for altered mental status.          ECG 12 Lead    Date/Time: 2/23/2024 10:21 AM  Performed by: Evangelina Goldman APRN    Authorized by: Evangelina Goldman APRN  Comparison: compared with previous ECG from 8/8/2023  Rhythm: sinus rhythm  Rate: normal  BPM: 63  Conduction: conduction normal  ST Segments: ST segments normal  T Waves: T waves normal  QRS axis:  "normal    Clinical impression: normal ECG          /72   Pulse 63   Resp 18   Ht 160 cm (63\")   Wt 62.5 kg (137 lb 12.8 oz)   SpO2 96%   BMI 24.41 kg/m²        Objective:     Vitals reviewed.   Constitutional:       General: Awake. Not in acute distress.     Appearance: Normal and healthy appearance. Well-developed, well-groomed and not in distress.   HENT:      Head: Normocephalic and atraumatic.   Pulmonary:      Effort: Pulmonary effort is normal.      Breath sounds: Normal breath sounds. No wheezing. No rhonchi. No rales.   Cardiovascular:      Normal rate. Regular rhythm.      Murmurs: There is no murmur.      No gallop.  No rub.   Edema:     Peripheral edema absent.   Musculoskeletal:      Cervical back: Normal range of motion and neck supple. Skin:     General: Skin is warm and dry.   Neurological:      Mental Status: Alert, oriented to person, place, and time and oriented to person, place and time.   Psychiatric:         Attention and Perception: Attention normal.         Mood and Affect: Mood normal.         Speech: Speech normal.         Behavior: Behavior normal. Behavior is cooperative.     /72   Pulse 63   Resp 18   Ht 160 cm (63\")   Wt 62.5 kg (137 lb 12.8 oz)   SpO2 96%   BMI 24.41 kg/m²     Lab Review:       Assessment:          Diagnosis Plan   1. Primary hypertension        2. Hyperlipidemia LDL goal <100        3. Hypothyroidism (acquired)                 Plan:       -Hypertension: She does not take often but states that her BP at home is usually in the 140s systolic. I have encouraged her to reduce salt, stay active,lose weight and monitor BP. If BP remains elevated we can discuss medication adjustments for management.     -Hyperlipidemia: on zetia. Continue with management through primary care.    -Hypothyroidism: currently stable. Following with her PCP office.       Low Sodium diet.   Routine Exercise.  Weight loss  6 month follow up         I attest that all portions " of this note have been reviewed and updated to reflect the patient's current status.

## 2024-08-26 ENCOUNTER — OFFICE VISIT (OUTPATIENT)
Dept: CARDIOLOGY | Facility: CLINIC | Age: 79
End: 2024-08-26
Payer: MEDICARE

## 2024-08-26 VITALS
SYSTOLIC BLOOD PRESSURE: 140 MMHG | HEIGHT: 63 IN | BODY MASS INDEX: 23.21 KG/M2 | WEIGHT: 131 LBS | DIASTOLIC BLOOD PRESSURE: 72 MMHG | HEART RATE: 45 BPM

## 2024-08-26 DIAGNOSIS — I10 PRIMARY HYPERTENSION: ICD-10-CM

## 2024-08-26 DIAGNOSIS — R00.1 BRADYCARDIA, DRUG INDUCED: Primary | ICD-10-CM

## 2024-08-26 DIAGNOSIS — E03.9 HYPOTHYROIDISM (ACQUIRED): ICD-10-CM

## 2024-08-26 DIAGNOSIS — T50.905A BRADYCARDIA, DRUG INDUCED: Primary | ICD-10-CM

## 2024-08-26 PROCEDURE — 1159F MED LIST DOCD IN RCRD: CPT | Performed by: NURSE PRACTITIONER

## 2024-08-26 PROCEDURE — 93000 ELECTROCARDIOGRAM COMPLETE: CPT | Performed by: NURSE PRACTITIONER

## 2024-08-26 PROCEDURE — 99214 OFFICE O/P EST MOD 30 MIN: CPT | Performed by: NURSE PRACTITIONER

## 2024-08-26 PROCEDURE — 1160F RVW MEDS BY RX/DR IN RCRD: CPT | Performed by: NURSE PRACTITIONER

## 2024-08-26 RX ORDER — LOSARTAN POTASSIUM 25 MG/1
25 TABLET ORAL DAILY
Qty: 30 TABLET | Refills: 1 | Status: SHIPPED | OUTPATIENT
Start: 2024-08-26

## 2024-08-26 NOTE — PROGRESS NOTES
Subjective:     Encounter Date: 08/26/2024      Patient ID: Twila Tanner is a 79 y.o. female with hypertension, hypothyroidism, hyperlipidemia, and family history of CAD, and arthritis.  She presents today for follow-up.    Chief Complaint: Routine Follow up   Hypertension  This is a chronic problem. The current episode started more than 1 year ago. The problem is unchanged. The problem is controlled. Associated symptoms include peripheral edema. Pertinent negatives include no chest pain, headaches, malaise/fatigue, orthopnea, palpitations, PND or shortness of breath. Risk factors for coronary artery disease include dyslipidemia and post-menopausal state. Current antihypertension treatment includes calcium channel blockers and beta blockers. There are no compliance problems.  There is no history of angina, kidney disease, CAD/MI, CVA or heart failure. Identifiable causes of hypertension include a thyroid problem.     Ms. Tanner presents to the office today for follow-up. She reports that she has been feeling well. She has no particular complaints. She has had mild ankle swelling that she has noticed from time to time. She remains active. She denies any dizziness, lightheadedness, near syncope, or syncope. She has felt a bit sluggish when asked but relates this to age and her thyroid. She does not check her vitals at home.       The following portions of the patient's history were reviewed and updated as appropriate: allergies, current medications, past family history, past medical history, past social history, and past surgical history.    No Known Allergies      Current Outpatient Medications:     aspirin 81 MG EC tablet, Take 1 tablet by mouth Every Night., Disp: , Rfl:     ezetimibe (ZETIA) 10 MG tablet, Take 1 tablet by mouth Daily., Disp: 90 tablet, Rfl: 3    levothyroxine (SYNTHROID, LEVOTHROID) 75 MCG tablet, Take 1 tablet by mouth Daily., Disp: , Rfl:     metoprolol succinate XL (TOPROL-XL) 25 MG 24  "hr tablet, Take 0.5 tablets by mouth Daily., Disp: , Rfl:     pantoprazole (PROTONIX) 40 MG EC tablet, Take 1 tablet by mouth Daily., Disp: , Rfl:     losartan (Cozaar) 25 MG tablet, Take 1 tablet by mouth Daily., Disp: 30 tablet, Rfl: 1      Review of Systems   Constitutional: Negative for malaise/fatigue, weight gain and weight loss.   Cardiovascular:  Positive for leg swelling (mild). Negative for chest pain, dyspnea on exertion, irregular heartbeat, near-syncope, orthopnea, palpitations, paroxysmal nocturnal dyspnea and syncope.   Respiratory:  Negative for cough, shortness of breath and wheezing.    Hematologic/Lymphatic: Negative for bleeding problem. Bruises/bleeds easily.   Musculoskeletal:  Positive for arthritis.   Gastrointestinal:  Negative for nausea and vomiting.   Neurological:  Negative for dizziness, focal weakness, headaches, light-headedness and weakness.   Psychiatric/Behavioral:  Negative for altered mental status.          ECG 12 Lead    Date/Time: 8/26/2024 10:21 AM  Performed by: Evangelina Goldman APRN    Authorized by: Evangelina Glodman APRN  Comparison: compared with previous ECG from 2/23/2024  Similar to previous ECG  Comparison to previous ECG: Heart rate has decreased by 18 bpm  Rhythm: sinus bradycardia  Rate: bradycardic  BPM: 45  Conduction: conduction normal  QRS axis: normal  Other findings: non-specific ST-T wave changes    Clinical impression: abnormal EKG          /72   Pulse (!) 45   Ht 160 cm (63\")   Wt 59.4 kg (131 lb)   BMI 23.21 kg/m²        Objective:     Vitals reviewed.   Constitutional:       General: Awake. Not in acute distress.     Appearance: Normal and healthy appearance. Well-developed, well-groomed and not in distress.   HENT:      Head: Normocephalic and atraumatic.   Pulmonary:      Effort: Pulmonary effort is normal.      Breath sounds: Normal breath sounds. No wheezing. No rhonchi. No rales.   Cardiovascular:      Normal rate. Regular rhythm.      " "Murmurs: There is no murmur.      No gallop.  No rub.   Edema:     Peripheral edema absent.   Musculoskeletal:      Cervical back: Normal range of motion and neck supple. Skin:     General: Skin is warm and dry.   Neurological:      Mental Status: Alert, oriented to person, place, and time and oriented to person, place and time.   Psychiatric:         Attention and Perception: Attention normal.         Mood and Affect: Mood normal.         Speech: Speech normal.         Behavior: Behavior normal. Behavior is cooperative.       /72   Pulse (!) 45   Ht 160 cm (63\")   Wt 59.4 kg (131 lb)   BMI 23.21 kg/m²     Lab Review:         Assessment:          Diagnosis Plan   1. Bradycardia, drug induced        2. Primary hypertension        3. Hypothyroidism (acquired)                   Plan:       - Bradycardia: The patient is managed on verapamil and metoprolol for hypertension.  She has been on both of these medications for quite some time.  Heart rates are significantly lower than usual.  She denies any symptoms suggestive of symptomatic bradycardia but has noted some sluggishness that she felt may be related to her thyroid disease and her age.  Given that she has had borderline high blood pressure readings at her office visits this year we will adjust her medications today.  Discontinue verapamil continue low-dose Toprol-XL.    -Hypertension: Discontinue verapamil.  Initiate losartan 25 mg daily.  We will adjust doses for improvement of blood pressure as needed with short-term follow-up in the office in 1 month.    -Hypothyroidism: On replacement therapy managed through primary care.      Discontinue verapamil.  Initiate losartan 25 mg daily.  Follow-up in 1 month      I attest that all portions of this note have been reviewed and updated to reflect the patient's current status.               "

## 2024-09-27 ENCOUNTER — OFFICE VISIT (OUTPATIENT)
Dept: CARDIOLOGY | Facility: CLINIC | Age: 79
End: 2024-09-27
Payer: MEDICARE

## 2024-09-27 VITALS
HEIGHT: 63 IN | WEIGHT: 131 LBS | DIASTOLIC BLOOD PRESSURE: 80 MMHG | OXYGEN SATURATION: 96 % | HEART RATE: 54 BPM | SYSTOLIC BLOOD PRESSURE: 150 MMHG | BODY MASS INDEX: 23.21 KG/M2

## 2024-09-27 DIAGNOSIS — E78.5 HYPERLIPIDEMIA LDL GOAL <100: ICD-10-CM

## 2024-09-27 DIAGNOSIS — E03.9 HYPOTHYROIDISM (ACQUIRED): ICD-10-CM

## 2024-09-27 DIAGNOSIS — I10 PRIMARY HYPERTENSION: Primary | ICD-10-CM

## 2024-09-27 PROCEDURE — 1159F MED LIST DOCD IN RCRD: CPT | Performed by: NURSE PRACTITIONER

## 2024-09-27 PROCEDURE — 1160F RVW MEDS BY RX/DR IN RCRD: CPT | Performed by: NURSE PRACTITIONER

## 2024-09-27 PROCEDURE — 99214 OFFICE O/P EST MOD 30 MIN: CPT | Performed by: NURSE PRACTITIONER

## 2024-09-27 RX ORDER — CELECOXIB 100 MG/1
100 CAPSULE ORAL DAILY
COMMUNITY
Start: 2024-09-10

## 2024-10-10 RX ORDER — LOSARTAN POTASSIUM 25 MG/1
25 TABLET ORAL DAILY
Qty: 90 TABLET | Refills: 3 | Status: SHIPPED | OUTPATIENT
Start: 2024-10-10

## 2024-10-14 RX ORDER — LOSARTAN POTASSIUM 25 MG/1
25 TABLET ORAL DAILY
Qty: 90 TABLET | Refills: 3 | Status: CANCELLED | OUTPATIENT
Start: 2024-10-14

## 2025-04-02 ENCOUNTER — OFFICE VISIT (OUTPATIENT)
Dept: CARDIOLOGY | Facility: CLINIC | Age: 80
End: 2025-04-02
Payer: MEDICARE

## 2025-04-02 VITALS
HEART RATE: 49 BPM | HEIGHT: 63 IN | WEIGHT: 127 LBS | BODY MASS INDEX: 22.5 KG/M2 | SYSTOLIC BLOOD PRESSURE: 100 MMHG | DIASTOLIC BLOOD PRESSURE: 60 MMHG

## 2025-04-02 DIAGNOSIS — R01.1 CARDIAC MURMUR: Primary | ICD-10-CM

## 2025-04-02 DIAGNOSIS — R06.02 SHORTNESS OF BREATH: ICD-10-CM

## 2025-04-02 DIAGNOSIS — E03.9 HYPOTHYROIDISM (ACQUIRED): ICD-10-CM

## 2025-04-02 DIAGNOSIS — I10 PRIMARY HYPERTENSION: ICD-10-CM

## 2025-04-02 DIAGNOSIS — E78.2 MIXED HYPERLIPIDEMIA: ICD-10-CM

## 2025-04-02 PROCEDURE — 99214 OFFICE O/P EST MOD 30 MIN: CPT | Performed by: NURSE PRACTITIONER

## 2025-04-02 PROCEDURE — 93000 ELECTROCARDIOGRAM COMPLETE: CPT | Performed by: NURSE PRACTITIONER

## 2025-04-02 NOTE — PROGRESS NOTES
Subjective:     Encounter Date: 04/02/2025      Patient ID: Twila Tanner is a 79 y.o. female with hypertension, hypothyroidism, hyperlipidemia, family history of CAD, and arthritis following with rheumatology.  She presents today for follow-up.    Chief Complaint: Routine Follow Up      History of Present Illness    Ms. Tanner presents to the office today for follow-up.  She has been stable since her last visit with me.  She reports that her blood pressure is well-controlled on her current medications.  She denies any frequent episodes of dizziness, lightheadedness.  She has no episodes of near syncope or syncope.  She reports that her biggest complaint now is dyspnea with exertion.  She also describes bendopnea at times.  She denies any chest pain or chest pressure frequently but has occasionally noted some chest discomfort from time to time for short periods of time.  Has any worsening symptoms that are progressively worsening in regards to her breathing or chest discomfort.    She denies any palpitations, persistent episodes of bradycardia or tachycardia.  She is compliant with her home medications.  She continues to follow with her primary care office for routine primary care needs.    Her heart rates have been stable.      The following portions of the patient's history were reviewed and updated as appropriate: allergies, current medications, past family history, past medical history, past social history, and past surgical history.    No Known Allergies      Current Outpatient Medications:     aspirin 81 MG EC tablet, Take 1 tablet by mouth Every Night., Disp: , Rfl:     celecoxib (CeleBREX) 100 MG capsule, Take 1 capsule by mouth Daily., Disp: , Rfl:     ezetimibe (ZETIA) 10 MG tablet, Take 1 tablet by mouth Daily., Disp: 90 tablet, Rfl: 3    levothyroxine (SYNTHROID, LEVOTHROID) 75 MCG tablet, Take 1 tablet by mouth Daily., Disp: , Rfl:     losartan (Cozaar) 25 MG tablet, Take 1 tablet by mouth  Daily., Disp: 90 tablet, Rfl: 3    metoprolol succinate XL (TOPROL-XL) 25 MG 24 hr tablet, Take 0.5 tablets by mouth Daily., Disp: , Rfl:     pantoprazole (PROTONIX) 40 MG EC tablet, Take 1 tablet by mouth Daily., Disp: , Rfl:       Review of Systems   Constitutional: Negative for diaphoresis, fever, malaise/fatigue, weight gain and weight loss.   Cardiovascular:  Positive for leg swelling (mild). Negative for chest pain, dyspnea on exertion, irregular heartbeat, near-syncope, orthopnea, palpitations, paroxysmal nocturnal dyspnea and syncope.   Respiratory:  Negative for cough and wheezing.    Hematologic/Lymphatic: Negative for bleeding problem. Bruises/bleeds easily.   Musculoskeletal:  Positive for arthritis.   Gastrointestinal:  Negative for nausea and vomiting.   Neurological:  Negative for dizziness, focal weakness, headaches, light-headedness and weakness.   Psychiatric/Behavioral:  Negative for altered mental status.          ECG 12 Lead    Date/Time: 4/2/2025 2:07 PM  Performed by: Evangelina Goldman APRN    Authorized by: Evangelina Goldman APRN  Comparison: compared with previous ECG from 8/26/2024  Similar to previous ECG  Rhythm: sinus bradycardia  Rate: bradycardic  BPM: 49  QRS axis: normal  Other findings: non-specific ST-T wave changes    Clinical impression: abnormal EKG           Objective:     Vitals reviewed.   Constitutional:       General: Awake. Not in acute distress.     Appearance: Normal and healthy appearance. Well-developed, well-groomed, normal weight and not in distress. Not ill-appearing.   HENT:      Head: Normocephalic and atraumatic.   Pulmonary:      Effort: Pulmonary effort is normal.      Breath sounds: Normal breath sounds. No wheezing. No rhonchi. No rales.   Cardiovascular:      Bradycardia present. Regular rhythm.      Murmurs: There is a diastolic murmur.   Edema:     Peripheral edema absent.   Musculoskeletal:      Cervical back: Normal range of motion and neck supple. Skin:    "  General: Skin is warm and dry.   Neurological:      Mental Status: Alert, oriented to person, place, and time and oriented to person, place and time.   Psychiatric:         Attention and Perception: Attention normal.         Mood and Affect: Mood normal.         Speech: Speech normal.         Behavior: Behavior normal. Behavior is cooperative.         Thought Content: Thought content normal.         Cognition and Memory: Cognition and memory normal.         Judgment: Judgment normal.       /60   Pulse (!) 49   Ht 160 cm (63\")   Wt 57.6 kg (127 lb)   BMI 22.50 kg/m²     Lab Review:           Assessment:          Diagnosis Plan   1. Cardiac murmur  Adult Transthoracic Echo Complete W/ Cont if Necessary Per Protocol      2. Primary hypertension        3. Hypothyroidism (acquired)        4. Mixed hyperlipidemia        5. Shortness of breath  Adult Transthoracic Echo Complete W/ Cont if Necessary Per Protocol           Plan:       - Cardiac murmur/shortness of breath: With complaints of shortness of breath and soft murmur on exam we will evaluate with echocardiogram.  Will follow-up with the patient based on the results and make further recommendations at that time.    -Hypertension: Stable on current medications.    -Hypothyroidism: On replacement therapy managed through primary care.    -Hyperlipidemia: Managed on Zetia.  Following with lipids through primary care.        Echocardiogram  Continue current medications  We will follow-up with the patient based on the results of her echocardiogram and make further recommendations pending results.        I attest that all portions of this note have been reviewed and updated to reflect the patient's current status.       "

## 2025-06-19 ENCOUNTER — HOSPITAL ENCOUNTER (OUTPATIENT)
Dept: CARDIOLOGY | Facility: HOSPITAL | Age: 80
Discharge: HOME | End: 2025-06-19
Admitting: NURSE PRACTITIONER
Payer: MEDICARE

## 2025-06-19 VITALS
HEART RATE: 57 BPM | HEIGHT: 63 IN | SYSTOLIC BLOOD PRESSURE: 151 MMHG | DIASTOLIC BLOOD PRESSURE: 47 MMHG | WEIGHT: 126.98 LBS | BODY MASS INDEX: 22.5 KG/M2

## 2025-06-19 DIAGNOSIS — R06.02 SHORTNESS OF BREATH: ICD-10-CM

## 2025-06-19 DIAGNOSIS — R01.1 CARDIAC MURMUR: ICD-10-CM

## 2025-06-19 PROCEDURE — 93306 TTE W/DOPPLER COMPLETE: CPT

## 2025-06-19 PROCEDURE — 93356 MYOCRD STRAIN IMG SPCKL TRCK: CPT

## 2025-06-21 LAB
AORTIC DIMENSIONLESS INDEX: 0.59 (DI)
ASCENDING AORTA: 2.9 CM
AV MEAN PRESS GRAD SYS DOP V1V2: 3.5 MMHG
AV VMAX SYS DOP: 123 CM/SEC
BH CV ECHO LEFT VENTRICLE GLOBAL LONGITUDINAL STRAIN: -21.4 %
BH CV ECHO MEAS - 2D AUTO EF SIEMENS: 62.8 %
BH CV ECHO MEAS - AO MAX PG: 6 MMHG
BH CV ECHO MEAS - AO ROOT DIAM: 2.44 CM
BH CV ECHO MEAS - AO V2 VTI: 32.8 CM
BH CV ECHO MEAS - AVA(I,D): 1.89 CM2
BH CV ECHO MEAS - EDV(CUBED): 65.4 ML
BH CV ECHO MEAS - ESV(CUBED): 11.6 ML
BH CV ECHO MEAS - FS: 43.8 %
BH CV ECHO MEAS - IVS/LVPW: 0.95 CM
BH CV ECHO MEAS - IVSD: 0.85 CM
BH CV ECHO MEAS - LA DIMENSION: 3.9 CM
BH CV ECHO MEAS - LAT PEAK E' VEL: 5.5 CM/SEC
BH CV ECHO MEAS - LV MASS(C)D: 106.6 GRAMS
BH CV ECHO MEAS - LV MAX PG: 2.23 MMHG
BH CV ECHO MEAS - LV MEAN PG: 1.18 MMHG
BH CV ECHO MEAS - LV V1 MAX: 74.6 CM/SEC
BH CV ECHO MEAS - LV V1 VTI: 19.4 CM
BH CV ECHO MEAS - LVIDD: 4 CM
BH CV ECHO MEAS - LVIDS: 2.26 CM
BH CV ECHO MEAS - LVOT AREA: 3.2 CM2
BH CV ECHO MEAS - LVOT DIAM: 2.02 CM
BH CV ECHO MEAS - LVPWD: 0.9 CM
BH CV ECHO MEAS - MED PEAK E' VEL: 4.9 CM/SEC
BH CV ECHO MEAS - MV A MAX VEL: 103.2 CM/SEC
BH CV ECHO MEAS - MV DEC SLOPE: 360.8 CM/SEC2
BH CV ECHO MEAS - MV DEC TIME: 0.25 SEC
BH CV ECHO MEAS - MV E MAX VEL: 89.2 CM/SEC
BH CV ECHO MEAS - MV E/A: 0.86
BH CV ECHO MEAS - MV MAX PG: 4.2 MMHG
BH CV ECHO MEAS - MV MEAN PG: 1.26 MMHG
BH CV ECHO MEAS - MV V2 VTI: 40.1 CM
BH CV ECHO MEAS - MVA(VTI): 1.55 CM2
BH CV ECHO MEAS - PA V2 MAX: 98.4 CM/SEC
BH CV ECHO MEAS - RAP SYSTOLE: 3 MMHG
BH CV ECHO MEAS - RV MAX PG: 1.55 MMHG
BH CV ECHO MEAS - RV V1 MAX: 62.1 CM/SEC
BH CV ECHO MEAS - RV V1 VTI: 16.7 CM
BH CV ECHO MEAS - RVDD: 3 CM
BH CV ECHO MEAS - RVSP: 28.3 MMHG
BH CV ECHO MEAS - SV(LVOT): 62 ML
BH CV ECHO MEAS - SVI(LVOT): 38.9 ML/M2
BH CV ECHO MEAS - TAPSE (>1.6): 2.08 CM
BH CV ECHO MEAS - TR MAX PG: 25.3 MMHG
BH CV ECHO MEAS - TR MAX VEL: 251.5 CM/SEC
BH CV ECHO MEASUREMENTS AVERAGE E/E' RATIO: 17.15
BH CV XLRA - TDI S': 13 CM/SEC
LEFT ATRIUM VOLUME INDEX: 24 ML/M2
LEFT ATRIUM VOLUME: 38 ML
SINUS: 2.8 CM
STJ: 2 CM